# Patient Record
(demographics unavailable — no encounter records)

---

## 2024-11-22 NOTE — HISTORY OF PRESENT ILLNESS
[N] : Patient does not use contraception [Menses] : menses [No] : Patient does not have concerns regarding sex [[place]] : intercourse [Bowel Movement] : bowel movement [LMPDate] : 10/23/24 [FreeTextEntry1] : 10/23/24

## 2024-11-22 NOTE — DISCUSSION/SUMMARY
[FreeTextEntry1] : GYN evaluation today Patient verbalized understanding of all explanations, and her questions were answered, and all concerns were addressed. Rx given for Pelvic MRI r/o endometriosis Patient was screened for depression - no signs of clinical depression, PHQ-2 on file. referred to see Dr. Romero - Colorectal surgeon for anal pain Patient's  should see a urologist- concern of prior hx of issues on Semen Analysis  Advised patient to see [facility] due to infertility RTO in end of February- for annual exam and  if not  able to connect with [facility] or support care and will attempt to see if normal patency and semen analysis then can do clomiphene or letrazole tx    I, Nilay Leblanc sole acting as scribe for Dr. Morales. 11/20/2024

## 2024-11-22 NOTE — PHYSICAL EXAM
[Chaperone Present] : A chaperone was present in the examining room during all aspects of the physical examination [Appropriately responsive] : appropriately responsive [Alert] : alert [No Acute Distress] : no acute distress [Oriented x3] : oriented x3 [FreeTextEntry2] : [facility] [place]